# Patient Record
(demographics unavailable — no encounter records)

---

## 2024-10-18 NOTE — HISTORY OF PRESENT ILLNESS
[ Donor] :  donor [Basiliximab] : basiliximab [Positive/Positive] : Donor Positive/Recipient Positive [Other: ___] : [unfilled] [PHS Increased Risk] : [facility] increased risk [DCD] : donation after cardiac death [Terminal Creatinine: ____] : Terminal Creatinine: [unfilled] [80: Normal activity with effort; some signs or symptoms of disease.] : 80: Normal activity with effort; some signs or symptoms of disease.  [TextBox_7] : 9/3/2020 [TextBox_34] : DGF and very high tacrolimus trough.  Had some nausea and dizziness post transplantation.  [TextBox_52] : 37 y.o female hx asthma, DMx4 years and HTN [FreeTextEntry1] : 60 years old  female with ESRD (8/2015), CKD known since 2012, on follow up with Dr. Lopez. She has known DM from age 55, not on insulin, HTN since 2006. Now s/p DCD DDRT 9/3/2020.  Pt discharged 9/8  PT was admitted to [facility] from May 21, 2024 until May 30, 2024 with transplant pyelonephritis and peritransplant abscess. MRI pelvis with IV contrast (May 23) Multiloculated right lower quadrant abscess (10.6 x 4.1 x 9.4 cm) adjacent to the right lower quadrant transplant. A 2.1 cm mass with internal enhancement or restricted diffusion within the lower pole of the transplant kidney. 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 IR aspirate cultures (May 24) moderate ESBL E. coli. CT A/P (6/12) Interval resolution of the perirenal abscess surrounding the transplant kidney in the right iliac fossa. Inferior polar renal transplant mass measures slightly smaller than on prior MRI 5/23/2024. Non occlusive filling defect in several right lower lobe pulmonary segmental arteries concerning for pulmonary embolism. Readmitted to [facility] for findings of PE on 6/12 CT A/P. Completed Ertapenem course on 6/21. s/p IR drain removal and PICC line removal.   [de-identified] : Had repeat MRI - lesion in kidney slightly smaller but present.  She has white coat HTN but BP has been high at home as well.

## 2024-10-18 NOTE — HISTORY OF PRESENT ILLNESS
[ Donor] :  donor [Basiliximab] : basiliximab [Positive/Positive] : Donor Positive/Recipient Positive [Other: ___] : [unfilled] [PHS Increased Risk] : [facility] increased risk [DCD] : donation after cardiac death [Terminal Creatinine: ____] : Terminal Creatinine: [unfilled] [80: Normal activity with effort; some signs or symptoms of disease.] : 80: Normal activity with effort; some signs or symptoms of disease.  [TextBox_7] : 9/3/2020 [TextBox_34] : DGF and very high tacrolimus trough.  Had some nausea and dizziness post transplantation.  [TextBox_52] : 37 y.o female hx asthma, DMx4 years and HTN [FreeTextEntry1] : 60 years old  female with ESRD (8/2015), CKD known since 2012, on follow up with Dr. Lopez. She has known DM from age 55, not on insulin, HTN since 2006. Now s/p DCD DDRT 9/3/2020.  Pt discharged 9/8  PT was admitted to [facility] from May 21, 2024 until May 30, 2024 with transplant pyelonephritis and peritransplant abscess. MRI pelvis with IV contrast (May 23) Multiloculated right lower quadrant abscess (10.6 x 4.1 x 9.4 cm) adjacent to the right lower quadrant transplant. A 2.1 cm mass with internal enhancement or restricted diffusion within the lower pole of the transplant kidney. 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 IR aspirate cultures (May 24) moderate ESBL E. coli. CT A/P (6/12) Interval resolution of the perirenal abscess surrounding the transplant kidney in the right iliac fossa. Inferior polar renal transplant mass measures slightly smaller than on prior MRI 5/23/2024. Non occlusive filling defect in several right lower lobe pulmonary segmental arteries concerning for pulmonary embolism. Readmitted to [facility] for findings of PE on 6/12 CT A/P. Completed Ertapenem course on 6/21. s/p IR drain removal and PICC line removal.   [de-identified] : Had repeat MRI - lesion in kidney slightly smaller but present.  She has white coat HTN but BP has been high at home as well.

## 2024-10-18 NOTE — PLAN
[FreeTextEntry1] : 1. Renal transplant - Pt s/p DDRT from DCD organ with SHAUNA. Creatinine back to baseline - 0.9.    2. Immunosuppression - simulect induction. Envarsus target 4-6,and pred 5.  Holding MMF in setting of early satiety and multiple infections.  3. HTN - BP now also high at home.  Increase losartan to 100mgs daily.   4. DM2 - check A1c.  5. Hyperlipidemia - on crestor now. 6. DVT - Diagnosed 10/15/20. Completed course of Eliquis but now back on it for PE in setting of repeated hospitalizations.  7. REnal mass? - going for repeat MRI in November.  If still present will need urology or biopsy.  May need cryoablation.  8.  CMV - resolved, stop valcyte.  9.  UTI with abscess - treated and resolved.  Abscess drained.  Has seen ID for f/u.   f/u in 2 months.  change CT to MRI to assess mass.

## 2024-10-18 NOTE — CONSULT LETTER
[Dear  ___] : Dear  [unfilled], [Courtesy Letter:] : I had the pleasure of seeing your patient, [unfilled], in my office today. [Please see my note below.] : Please see my note below. [Consult Closing:] : Thank you very much for allowing me to participate in the care of this patient.  If you have any questions, please do not hesitate to contact me. [Sincerely,] : Sincerely, [FreeTextEntry3] : Oswaldo Mckeon

## 2024-10-18 NOTE — PHYSICAL EXAM
[General Appearance - Alert] : alert [General Appearance - In No Acute Distress] : in no acute distress [General Appearance - Well-Appearing] : healthy appearing [Sclera] : the sclera and conjunctiva were normal [PERRL With Normal Accommodation] : pupils were equal in size, round, and reactive to light [Extraocular Movements] : extraocular movements were intact [Outer Ear] : the ears and nose were normal in appearance [Neck Appearance] : the appearance of the neck was normal [Neck Cervical Mass (___cm)] : no neck mass was observed [Auscultation Breath Sounds / Voice Sounds] : lungs were clear to auscultation bilaterally [Heart Rate And Rhythm] : heart rate was normal and rhythm regular [Heart Sounds] : normal S1 and S2 [Heart Sounds Gallop] : no gallops [Edema] : there was no peripheral edema [Bowel Sounds] : normal bowel sounds [Abdomen Soft] : soft [Abdomen Tenderness] : non-tender [Cervical Lymph Nodes Enlarged Posterior Bilaterally] : posterior cervical [Cervical Lymph Nodes Enlarged Anterior Bilaterally] : anterior cervical [Supraclavicular Lymph Nodes Enlarged Bilaterally] : supraclavicular [Abnormal Walk] : normal gait [Nail Clubbing] : no clubbing  or cyanosis of the fingernails [Musculoskeletal - Swelling] : no joint swelling seen [Skin Turgor] : normal skin turgor [] : no rash [Skin Lesions] : no skin lesions [Oriented To Time, Place, And Person] : oriented to person, place, and time [Impaired Insight] : insight and judgment were intact [Affect] : the affect was normal [FreeTextEntry1] : + SHABANA 3/6

## 2025-04-25 NOTE — HISTORY OF PRESENT ILLNESS
[FreeTextEntry1] : 69 yo female presents to the office to evaluate left avf. Unscheduled visit. Accompanied by . History of ESRD, transplanted kidney, DVT/PE, and left thoracic outlet syndrome. 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 She has been off dialysis for some time and her transplanted kidney is functioning fine. Yesterday, the pt noticed the thrill was not palpable. She presents for evaluation. On long term Eliquis for hx of DVT/PE. Denies problems with lower extremities.

## 2025-04-25 NOTE — PHYSICAL EXAM
[2+] : left 2+ [Ankle Swelling (On Exam)] : not present [Varicose Veins Of Lower Extremities] : not present [] : not present [No Rash or Lesion] : No rash or lesion [Alert] : alert [Oriented to Person] : oriented to person [Oriented to Place] : oriented to place [Oriented to Time] : oriented to time [Calm] : calm [de-identified] : NAD [de-identified] : NCAT [de-identified] : unlabored breathing [FreeTextEntry1] : no palpable thrill over left avf left avf with aneurysmal dilatations good palpable left radial pulse bilateral lower extremities soft, warm and nontender no significant leg edema no wounds

## 2025-04-25 NOTE — ASSESSMENT
[FreeTextEntry1] : Impression - Occluded left avf.  Pt is s/p transplanted kidney which is functioning well. She has been off HD. Hx of DVT/PE on eliquis  seen and examined with Dr. Escobar  Plan Wear compression sleeve 15-20 mm hg for left upper extremity Return to office in 4-6 weeks to re-evaluate left upper extremity [Arterial/Venous Disease] : arterial/venous disease